# Patient Record
Sex: FEMALE | Race: AMERICAN INDIAN OR ALASKA NATIVE
[De-identification: names, ages, dates, MRNs, and addresses within clinical notes are randomized per-mention and may not be internally consistent; named-entity substitution may affect disease eponyms.]

---

## 2021-12-20 ENCOUNTER — HOSPITAL ENCOUNTER (OUTPATIENT)
Dept: HOSPITAL 5 - MAMMO | Age: 51
Discharge: HOME | End: 2021-12-20
Payer: COMMERCIAL

## 2021-12-20 DIAGNOSIS — Z12.31: Primary | ICD-10-CM

## 2021-12-20 PROCEDURE — 77067 SCR MAMMO BI INCL CAD: CPT

## 2021-12-21 NOTE — MAMMOGRAPHY REPORT
DIGITAL SCREENING MAMMOGRAM WITH CAD, 12/20/2021



CLINICAL INFORMATION / INDICATION: Routine screening mammography.



TECHNIQUE:  Digital bilateral 2D mammography was obtained in the craniocaudal and mediolateral obliqu
e projections. This examination was interpreted with the benefit of Computer-Aided Detection analysis
.



COMPARISON: None. This is the patient's first mammogram.



FINDINGS: 



Breast Density: There are scattered areas of fibroglandular density.



No dominant mass, suspicious calcifications, or architectural distortion in either breast. 



Postsurgical changes from bilateral breast reduction are noted.

 

IMPRESSION: No mammographic evidence of malignancy.



Follow up recommendation: Routine yearly



BI-RADS Category 2:  Benign.





-------------------------------------------------------------------------------------------

A "normal" or negative report should not discourage follow up or biopsy of a clinically significant f
inding.



A written summary of these findings will be mailed to the patient. The patient will be entered into a
 mammography reporting system which will generate a reminder letter for the patient's next appointmen
t at the appropriate interval.



The [employer] College [employer] recommends yearly mammograms starting at age 40 and continuing as l
mariya as a woman is in good health.  Breast MRI is recommended for women with an approximate 20-25% or 
greater lifetime risk of breast cancer, including women with a strong family history of breast or ova
crystal cancer or who have been treated for Hodgkin's disease.



Signer Name: Johnna Martinez MD 

Signed: 12/21/2021 10:27 AM

Workstation Name: [email or web address]

## 2022-03-23 ENCOUNTER — HOSPITAL ENCOUNTER (INPATIENT)
Dept: HOSPITAL 5 - ED | Age: 52
LOS: 1 days | Discharge: HOME | DRG: 291 | End: 2022-03-24
Attending: INTERNAL MEDICINE | Admitting: INTERNAL MEDICINE
Payer: SELF-PAY

## 2022-03-23 DIAGNOSIS — F32.9: ICD-10-CM

## 2022-03-23 DIAGNOSIS — Z79.01: ICD-10-CM

## 2022-03-23 DIAGNOSIS — Z82.49: ICD-10-CM

## 2022-03-23 DIAGNOSIS — I11.0: Primary | ICD-10-CM

## 2022-03-23 DIAGNOSIS — I50.43: ICD-10-CM

## 2022-03-23 LAB
ALBUMIN SERPL-MCNC: 4.4 G/DL (ref 3.9–5)
ALT SERPL-CCNC: 14 UNITS/L (ref 7–56)
BASOPHILS # (AUTO): 0 K/MM3 (ref 0–0.1)
BASOPHILS NFR BLD AUTO: 0.8 % (ref 0–1.8)
BUN SERPL-MCNC: 14 MG/DL (ref 7–17)
BUN/CREAT SERPL: 20 %
CALCIUM SERPL-MCNC: 9.5 MG/DL (ref 8.4–10.2)
EOSINOPHIL # BLD AUTO: 0.1 K/MM3 (ref 0–0.4)
EOSINOPHIL NFR BLD AUTO: 1.7 % (ref 0–4.3)
HCT VFR BLD CALC: 36.5 % (ref 30.3–42.9)
HEMOLYSIS INDEX: 5
HGB BLD-MCNC: 11.4 GM/DL (ref 10.1–14.3)
LYMPHOCYTES # BLD AUTO: 1.6 K/MM3 (ref 1.2–5.4)
LYMPHOCYTES NFR BLD AUTO: 29.9 % (ref 13.4–35)
MCHC RBC AUTO-ENTMCNC: 31 % (ref 30–34)
MCV RBC AUTO: 85 FL (ref 79–97)
MONOCYTES # (AUTO): 0.5 K/MM3 (ref 0–0.8)
MONOCYTES % (AUTO): 8.3 % (ref 0–7.3)
PLATELET # BLD: 284 K/MM3 (ref 140–440)
RBC # BLD AUTO: 4.3 M/MM3 (ref 3.65–5.03)

## 2022-03-23 PROCEDURE — 93017 CV STRESS TEST TRACING ONLY: CPT

## 2022-03-23 PROCEDURE — 80053 COMPREHEN METABOLIC PANEL: CPT

## 2022-03-23 PROCEDURE — 71275 CT ANGIOGRAPHY CHEST: CPT

## 2022-03-23 PROCEDURE — 93005 ELECTROCARDIOGRAM TRACING: CPT

## 2022-03-23 PROCEDURE — A9502 TC99M TETROFOSMIN: HCPCS

## 2022-03-23 PROCEDURE — 93306 TTE W/DOPPLER COMPLETE: CPT

## 2022-03-23 PROCEDURE — 83880 ASSAY OF NATRIURETIC PEPTIDE: CPT

## 2022-03-23 PROCEDURE — 71046 X-RAY EXAM CHEST 2 VIEWS: CPT

## 2022-03-23 PROCEDURE — 85025 COMPLETE CBC W/AUTO DIFF WBC: CPT

## 2022-03-23 PROCEDURE — C8929 TTE W OR WO FOL WCON,DOPPLER: HCPCS

## 2022-03-23 PROCEDURE — 85379 FIBRIN DEGRADATION QUANT: CPT

## 2022-03-23 PROCEDURE — 78452 HT MUSCLE IMAGE SPECT MULT: CPT

## 2022-03-23 PROCEDURE — 36415 COLL VENOUS BLD VENIPUNCTURE: CPT

## 2022-03-23 PROCEDURE — 93970 EXTREMITY STUDY: CPT

## 2022-03-23 PROCEDURE — 84484 ASSAY OF TROPONIN QUANT: CPT

## 2022-03-23 NOTE — EMERGENCY DEPARTMENT REPORT
ED Chest Pain HPI





- General


Chief Complaint: Dyspnea/Respdistress


Stated Complaint: SOB, SWOLLEN ANKLE


PUI?: No


Time Seen by Provider: 22 09:08


Source: patient


Mode of arrival: Ambulatory


Limitations: No Limitations





- History of Present Illness


Initial Comments: 


51-year-old female with a past medical history of hypertension and depression 

presents to the ER today with complaints of shortness of breath, chest pain and 

bilateral lower extremity swelling.  Patient states that 6 to 7 weeks ago she 

started noticing that when on exertion she would have substernal chest pain 

which she described as a tightness sometimes burning and severe shortness of 

breath.  She states that the symptoms did improve with rest.  She states that 

when she became short of breath and started with chest pain she would sometimes 

get dizzy and diaphoretic but she has not had any nausea or vomiting.  She 

reports that 3 to 4 weeks ago she started having swelling to her bilateral lower

extremity including ankle and feet.  She states that she has not noticed much 

improvement of the swelling with elevation of her legs.  She denies any calf 

pain or leg pain with the swelling.  She denies any known history of heart 

disease or kidney disease.  She denies any history of DVT or PE.  She denies any

risk factors for PE or DVT.  She states that her dad did suffer from heart 

disease including having an MI and CHF.  She states that she is never been 

evaluated by cardiologist in the past.


MD Complaint: chest pain, other (SOB; LE swelling)


-: Gradual, week(s) (6-7 weeks )





- Related Data


                                  Previous Rx's











 Medication  Instructions  Recorded  Last Taken  Type


 


Butalb/Acetamin/Caff -40 1 each PO Q4H PRN #12 tablet 10/19/21 Unknown Rx





[Fioricet -40]    











                                    Allergies











Allergy/AdvReac Type Severity Reaction Status Date / Time


 


No Known Allergies Allergy   Unverified 10/19/21 07:09














Heart Score





- HEART Score


History: Moderately suspicious


EKG: Non-specific


Age: 45-65


Risk factors: 1-2 risk factors (obesity, HTN, family hx (father))


Troponin: < normal limit


HEART Score: 4





- EKG Read Time


Time EKG Completed: 10:28


EKG Read Time: 10:24





- Critical Actions


Critical Actions: 4-6 pts:12-16.6% risk of adverse cardiac event. Should be 

admitted





ED Review of Systems


ROS: 


Stated complaint: SOB, SWOLLEN ANKLE


Other details as noted in HPI





Comment: All other systems reviewed and negative


Constitutional: denies: chills, fever


Eyes: denies: eye pain, eye discharge, vision change


ENT: denies: ear pain, throat pain


Respiratory: shortness of breath, wheezing.  denies: SOB with exertion, SOB at 

rest


Cardiovascular: chest pain, dyspnea on exertion, edema


Gastrointestinal: denies: abdominal pain, nausea, diarrhea


Genitourinary: denies: urgency, dysuria, discharge


Musculoskeletal: joint swelling.  denies: back pain, arthralgia


Skin: denies: rash, lesions


Neurological: denies: headache, weakness, numbness, paresthesias, confusion, 

abnormal gait, other


Psychiatric: denies: anxiety, depression, auditory hallucinations, visual 

hallucinations, homicidal thoughts, suicidal thoughts


Hematological/Lymphatic: denies: easy bleeding, easy bruising





ED Past Medical Hx





- Past Medical History


Hx Hypertension: Yes


Hx Psychiatric Treatment: Yes (depression)





- Medications


Home Medications: 


                                Home Medications











 Medication  Instructions  Recorded  Confirmed  Last Taken  Type


 


Butalb/Acetamin/Caff -40 1 each PO Q4H PRN #12 tablet 10/19/21  Unknown Rx





[Fioricet -40]     














ED Physical Exam





- General


Limitations: No Limitations


General appearance: alert, in no apparent distress, obese





- Eye


Eye exam: Present: normal appearance, PERRL, EOMI


Pupils: Present: normal accommodation





- Neck


Neck exam: Present: normal inspection, full ROM.  Absent: meningismus





- Respiratory


Respiratory exam: Present: normal lung sounds bilaterally.  Absent: respiratory 

distress, wheezes, rales, rhonchi, stridor





- Cardiovascular


Cardiovascular Exam: Present: regular rate, normal rhythm, normal heart sounds. 

Absent: systolic murmur, diastolic murmur





- GI/Abdominal


GI/Abdominal exam: Present: soft.  Absent: tenderness, guarding, rebound





- Extremities Exam


Extremities exam: Present: full ROM, pedal edema (Moderate nonpitting edema 

noted to lower legs, ankles and feet).  Absent: tenderness, calf tenderness





- Neurological Exam


Neurological exam: Present: alert, oriented X3, CN II-XII intact, normal gait





- Psychiatric


Psychiatric exam: Present: normal affect, normal mood





ED Course


                                   Vital Signs











  22





  06:09 13:15


 


Temperature 98.0 F 


 


Pulse Rate 66 64


 


Respiratory 20 16





Rate  


 


Blood Pressure 160/100 


 


Blood Pressure  146/74





[Right]  


 


O2 Sat by Pulse 98 100





Oximetry  














ED Medical Decision Making





- Lab Data


Result diagrams: 


                                 22 09:26





                                 22 09:26





- EKG Data


EKG shows normal: sinus rhythm


Rate: normal (59)


  ** No standard instances


Axis/QRS: right axis deviation





- EKG Data


Interpretation: nonspecific ST-T wave enrique





- Radiology Data


Radiology results: report reviewed


Patient: ELI FOX                                                 

               MR#:   


[id number]          


: 1970                                                                

Acct:[id number]      


 


Age/Sex: 51 / F                                                                

ADM Date: 22     


 


Loc: ED       


Attending Dr:   


 


 


Ordering Physician: BELINDA ROQUE  


Date of Service: 22  


Procedure(s): XR chest routine 2V  


Accession Number(s): [id number]  


 


cc: BELINDA ROQUE   


 


Fluoro Time In Minutes:   


 


CHEST 2 VIEWS   


 


 INDICATION:  Dyspnea.  


 


 COMPARISON:  none  


 


 FINDINGS:  


 Support devices: None.  


 


 Heart: Within normal limits.   


 Lungs/pleura: No acute air space or interstitial disease.  No pneumothorax.  


 


 Additional findings: None.  


 


 IMPRESSION:  


 No acute findings.  


 


 Signer Name: Aleks Dodson Jr, MD   


 Signed: 3/23/2022 9:51 AM  


 Workstation Name: [id number]   


 


 


Transcribed By: TTR  


Dictated By: ALEKS DODSON JR, MD  


Electronically Authenticated By: ALEKS DODSON JR, MD    


Signed Date/Time: 22                                


 


 


 


DD/DT: 22                                                            

  


TD/TT:





Patient: ELI FOX                                                 

               MR#:   


[id number]          


: 1970                                                                

Acct:[id number]      


 


Age/Sex: 51 / F                                                                

ADM Date: 22     


 


Loc: ED       


Attending Dr:   


 


 


Ordering Physician: BELINDA ROQUE  


Date of Service: 22  


Procedure(s): VL venous duplex LE BILAT  


Accession Number(s): [id number]  


 


cc: BELINDA ROQUE   


 


 


Bilateral lower extremity Doppler venous ultrasound  


 


 INDICATION: Shortness of breath  


 


 FINDINGS: Bilateral common femoral veins, superficial femoral veins and 

popliteal veins have normal


compressibility and phasic flow.  


 


 IMPRESSION:  


 No evidence for DVT.  


 


 Signer Name: Orestes Quan MD   


 Signed: 3/23/2022 10:32 AM  


 Workstation Name: [id number]   


 


 


Transcribed By: CHERELLE  


Dictated By: DENIS QUAN MD  


Electronically Authenticated By: DENIS QUAN MD    


Signed Date/Time: 22                                


 


 


 


DD/DT: 22                                                            

  


TD/TT:





Patient: ELI FOX                                                 

               MR#:   


[id number]          


: 1970                                                                

Acct:[id number]      


 


Age/Sex: 51 / F                                                                

ADM Date: 22     


 


Loc: ED       


Attending Dr:   


 


 


Ordering Physician: BELINDA ROQUE  


Date of Service: 22  


Procedure(s): CT angio chest  


Accession Number(s): [id number]  


 


cc: BELINDA ROQUE   


 


 


 


 


 CTA CHEST WITH IV CONTRAST  


 


 INDICATION:  


 SOB/elevated d-dimer. Chest pain  


 


 TECHNIQUE:  


 Axial CT images were obtained through the chest after injection of 100 mL 

Omnipaque 350 IV 


contrast. 3 plane MIP reconstructions were produced. All CT scans at this 

location are performed u


sing CT dose reduction for [facility] by means of automated exposure control.   


 


 COMPARISON:  


 None available.  


 


 FINDINGS:  


 PULMONARY ARTERIES: No pulmonary emboli.  


 AORTA AND ARTERIES: No acute abnormality.  


 MEDIASTINUM: Mild bilateral adenopathy involving both patricia..   


 LUNGS: No suspicious consolidation, nodule or mass.  No pneumothorax or pleural

 effusion.    


 


 ADDITIONAL FINDINGS: None.  


 


 UPPER ABDOMEN: No acute findings.  


 


 BONES: No significant osseous abnormality.  


 


 IMPRESSION:  


 1. No CT evidence for pulmonary embolism.   


 2. Mild bilateral hilar adenopathy.  


 


 Signer Name: Dru Katz MD   


 Signed: 3/23/2022 11:14 AM  


 Workstation Name: [id number]   


 


 


Transcribed By: BC  


Dictated By: Dru Katz MD  


Electronically Authenticated By: Dru Katz MD    


Signed Date/Time: 224                                


 


 


 


DD/DT: 22                                                            

  


TD/TT:





- Medical Decision Making


Work up reviewed --chest x-ray shows no acute abnormalities.  Troponin x2 

normal.  CBC and CMP unremarkable.  BNP is normal.  EKG shows no nonspecific T 

wave changes but STEMI or significant dysrhythmias.  Her venous Dopplers were 

negative for DVT.  CTA chest was normal without evidence of PE.  Case was disc

ussed with Dr. CALLUM Suarez.  Given patient history, and her risk factors, heart 

score 4 concerns for unstable angina patient will be admitted to the hospital 

for further cardiac work-up.


Dr. CALLUM Suarez discussed case with Dr. Iniguez, hospitalist for admission.


Patient currently sitting comfortably on the recliner.  She is not currently in 

any acute pain or respiratory distress.  Repeat vital signs are stable.  

Discussed all results with patient.  Discussed the concerns and reason for 

admission with patient.  Patient expressed understanding agree with plan.  

Patient was stable at time of discharge per


Critical care attestation.: 


If time is entered above; I have spent that time in minutes in the direct care 

of this critically ill patient, excluding procedure time.








ED Disposition


Clinical Impression: 


 Unstable angina, Dyspnea, Peripheral edema





Disposition:  ADMITTED AS INPATIENT


Is pt being admited?: Yes


Condition: Stable


Referrals: 


PRIMARY CARE,MD [Primary Care Provider] - 3-5 Days

## 2022-03-23 NOTE — HISTORY AND PHYSICAL REPORT
History of Present Illness


Date of examination: 03/23/22


Date of admission: 


March 23rd 2022


Chief complaint: 


Shortness of breath on minimal exertion for 1 week





History of present illness: 


51-year-old female with past medical history of hypertension and depression com

es in for increasing shortness of breath, chest pain and bilateral lower 

extremity swelling for 1 week.  Patient noticed exertion on dyspnea about 6 

weeks ago.  Patient stated she is compliant with her blood pressure medications.

 Did not seek any medical attention because of her problems with insurance.  

Patient is orthopneic.  No paroxysmal nocturnal dyspnea.  Chest pain is 

intermittent.  About 5 on a scale of 1-10.  Precipitated by exertion.  No 

diaphoresis.  No radiation of the chest pain.  No nausea or vomiting.  Patient 

also noticed bilateral lower extremity swelling including ankle and feet.








Heart Score





- HEART Score


History: Moderately suspicious


EKG: Non-specific


Age: 45-65


Risk factors: 1-2 risk factors (obesity, HTN, family hx (father))


Troponin: < normal limit


HEART Score: 4





- EKG Read Time


Time EKG Completed: 10:28


EKG Read Time: 10:24





- Critical Actions


Critical Actions: 4-6 pts:12-16.6% risk of adverse cardiac event. Should be 

admitted








- Past Medical History 


--Hypertension: Yes


--Psychiatric Treatment: Yes (depression)





-Past surgical history 


none





-Family history 


--CHF





-Social history


-- no smoking or alcohol








- Medications


Home Medications: 


                                Home Medications











 Medication  Instructions  Recorded  Confirmed  Last Taken  Type


 


Butalb/Acetamin/Caff -40 1 each PO Q4H PRN #12 tablet 10/19/21  Unknown Rx





[Fioricet -40]     

















Review of Systems


ROS: 


Stated complaint: SOB, SWOLLEN ANKLE


Other details as noted in HPI





Comment: All other systems reviewed and negative


Constitutional: denies: chills, fever


Eyes: denies: eye pain, eye discharge, vision change


ENT: denies: ear pain, throat pain


Respiratory: shortness of breath, wheezing.  denies: SOB with exertion, SOB at 

rest


Cardiovascular: chest pain, dyspnea on exertion, edema


Gastrointestinal: denies: abdominal pain, nausea, diarrhea


Genitourinary: denies: urgency, dysuria, discharge


Musculoskeletal: joint swelling.  denies: back pain, arthralgia


Skin: denies: rash, lesions


Neurological: denies: headache, weakness, numbness, paresthesias, confusion, 

abnormal gait, other


Psychiatric: denies: anxiety, depression, auditory hallucinations, visual 

hallucinations, homicidal thoughts, suicidal thoughts


Hematological/Lymphatic: denies: easy bleeding, easy bruising























Medications and Allergies


                                    Allergies











Allergy/AdvReac Type Severity Reaction Status Date / Time


 


No Known Allergies Allergy   Verified 03/23/22 17:53











                                Home Medications











 Medication  Instructions  Recorded  Confirmed  Last Taken  Type


 


Butalb/Acetamin/Caff -40 1 each PO Q4H PRN #12 tablet 10/19/21  Unknown Rx





[Fioricet -40]     














Exam





- Constitutional


Vitals: 


                                        











Temp Pulse Resp BP Pulse Ox


 


 98.0 F   64   16   146/74   100 


 


 03/23/22 06:09  03/23/22 13:15  03/23/22 13:15  03/23/22 13:15  03/23/22 13:15











General appearance: Present: mild distress, well-nourished





- EENT


Eyes: Present: PERRL


ENT: hearing intact, clear oral mucosa





- Neck


Neck: Present: supple, normal ROM





- Respiratory


Respiratory effort: normal


Respiratory: bilateral: CTA





- Cardiovascular


Heart rate: 106


Rhythm: regular


Heart Sounds: Present: S1 & S2.  Absent: rub, click





- Extremities


Extremities: pulses symmetrical, No edema (3+ pedal edema)


Peripheral Pulses: within normal limits





- Abdominal


General gastrointestinal: Present: soft, non-tender, non-distended, normal bowel

sounds


Female genitourinary: Present: normal





- Integumentary


Integumentary: Present: clear, warm, dry





- Musculoskeletal


Musculoskeletal: gait normal, strength equal bilaterally





- Psychiatric


Psychiatric: appropriate mood/affect, intact judgment & insight





- Neurologic


Neurologic: CNII-XII intact, moves all extremities





- Allied Health


Allied health notes reviewed: nursing, case management





HEART Score





- HEART Score


EKG: Non-specific


Age: 45-65


Risk factors: 1-2 risk factors (obesity, HTN, family hx (father))


Troponin: 


                                        











Troponin T  < 0.010 ng/mL (0.00-0.029)   03/23/22  12:38    











Troponin: < normal limit





- Critical Actions


Critical Actions: 4-6 pts:12-16.6% risk of adverse cardiac event. Should be 

admitted





Results





- Labs


CBC & Chem 7: 


                                 03/23/22 09:26





                                 03/24/22 05:48


Labs: 


                             Laboratory Last Values











WBC  5.4 K/mm3 (4.5-11.0)   03/23/22  09:26    


 


RBC  4.30 M/mm3 (3.65-5.03)   03/23/22  09:26    


 


Hgb  11.4 gm/dl (10.1-14.3)   03/23/22  09:26    


 


Hct  36.5 % (30.3-42.9)   03/23/22  09:26    


 


MCV  85 fl (79-97)   03/23/22  09:26    


 


MCH  27 pg (28-32)  L  03/23/22  09:26    


 


MCHC  31 % (30-34)   03/23/22  09:26    


 


RDW  17.2 % (13.2-15.2)  H  03/23/22  09:26    


 


Plt Count  284 K/mm3 (140-440)   03/23/22  09:26    


 


Lymph % (Auto)  29.9 % (13.4-35.0)   03/23/22  09:26    


 


Mono % (Auto)  8.3 % (0.0-7.3)  H  03/23/22  09:26    


 


Eos % (Auto)  1.7 % (0.0-4.3)   03/23/22  09:26    


 


Baso % (Auto)  0.8 % (0.0-1.8)   03/23/22  09:26    


 


Lymph # (Auto)  1.6 K/mm3 (1.2-5.4)   03/23/22  09:26    


 


Mono # (Auto)  0.5 K/mm3 (0.0-0.8)   03/23/22  09:26    


 


Eos # (Auto)  0.1 K/mm3 (0.0-0.4)   03/23/22  09:26    


 


Baso # (Auto)  0.0 K/mm3 (0.0-0.1)   03/23/22  09:26    


 


Seg Neutrophils %  59.3 % (40.0-70.0)   03/23/22  09:26    


 


Seg Neutrophils #  3.2 K/mm3 (1.8-7.7)   03/23/22  09:26    


 


D-Dimer  241.42 ng/mlDDU (0-234)  H  03/23/22  09:26    


 


Sodium  142 mmol/L (137-145)   03/23/22  09:26    


 


Potassium  3.9 mmol/L (3.6-5.0)   03/23/22  09:26    


 


Chloride  107.7 mmol/L ()  H  03/23/22  09:26    


 


Carbon Dioxide  23 mmol/L (22-30)   03/23/22  09:26    


 


Anion Gap  15 mmol/L  03/23/22  09:26    


 


BUN  14 mg/dL (7-17)   03/23/22  09:26    


 


Creatinine  0.7 mg/dL (0.6-1.2)   03/23/22  09:26    


 


Estimated GFR  > 60 ml/min  03/23/22  09:26    


 


BUN/Creatinine Ratio  20 %  03/23/22  09:26    


 


Glucose  98 mg/dL ()   03/23/22  09:26    


 


Calcium  9.5 mg/dL (8.4-10.2)   03/23/22  09:26    


 


Total Bilirubin  0.20 mg/dL (0.1-1.2)   03/23/22  09:26    


 


AST  17 units/L (5-40)   03/23/22  09:26    


 


ALT  14 units/L (7-56)   03/23/22  09:26    


 


Alkaline Phosphatase  67 units/L ()   03/23/22  09:26    


 


Troponin T  < 0.010 ng/mL (0.00-0.029)   03/23/22  12:38    


 


NT-Pro-B Natriuret Pep  87.09 pg/mL (0-900)   03/23/22  09:26    


 


Total Protein  7.3 g/dL (6.3-8.2)   03/23/22  09:26    


 


Albumin  4.4 g/dL (3.9-5)   03/23/22  09:26    


 


Albumin/Globulin Ratio  1.5 %  03/23/22  09:26    














- Imaging and Cardiology


EKG: report reviewed (Sinus rhythm no acute ST-T wave changes)





Assessment and Plan


Advance Directives: Yes (Full code)


VTE prophylaxis?: Chemical


Plan of care discussed with patient/family: Yes





- Patient Problems


(1) Acute exacerbation of CHF (congestive heart failure)


Current Visit: Yes   Status: Acute   


Qualifiers: 


   Heart failure type: combined systolic and diastolic   Qualified Code(s): 

I50.43 - Acute on chronic combined systolic (congestive) and diastolic 

(congestive) heart failure   


Plan to address problem: 


Patient initiated on IV Lasix and potassium


Echocardiogram for ejection fraction


Strict I and O's


Daily weights


Control blood pressure








(2) Acute coronary syndrome


Current Visit: Yes   Status: Acute   


Plan to address problem: 


Serial troponins and Lexiscan in the morning








(3) Hypertension


Current Visit: Yes   Status: Chronic   


Qualifiers: 


   Hypertension type: primary hypertension   Qualified Code(s): I10 - Essential 

(primary) hypertension   


Plan to address problem: 


Continue antihypertensives and adjust medications








(4) Peripheral edema


Current Visit: Yes   Status: Acute   


Plan to address problem: 


IV Lasix for now








(5) DVT prophylaxis


Current Visit: Yes   Status: Acute   


Plan to address problem: 


On heparin and GI prophylaxis








(6) Advance care planning


Current Visit: Yes   Status: Acute   


Plan to address problem: 


Disease education conducted, care plan discussed, diagnosis discussed, prognosis

discussed.  Patient is full code.  Patient acknowledges understanding and 

agreement with care plan.  +30 minutes.

## 2022-03-23 NOTE — XRAY REPORT
CHEST 2 VIEWS 



INDICATION:  Dyspnea.



COMPARISON:  none



FINDINGS:

Support devices: None.



Heart: Within normal limits. 

Lungs/pleura: No acute air space or interstitial disease.  No pneumothorax.



Additional findings: None.



IMPRESSION:

No acute findings.



Signer Name: Aleks Dodson Jr, MD 

Signed: 3/23/2022 9:51 AM

Workstation Name: [id number]

## 2022-03-23 NOTE — CAT SCAN REPORT
CTA CHEST WITH IV CONTRAST



INDICATION:

SOB/elevated d-dimer. Chest pain



TECHNIQUE:

Axial CT images were obtained through the chest after injection of 100 mL Omnipaque 350 IV contrast. 
3 plane MIP reconstructions were produced. All CT scans at this location are performed using CT dose 
reduction for [facility] by means of automated exposure control. 



COMPARISON:

None available.



FINDINGS:

PULMONARY ARTERIES: No pulmonary emboli.

AORTA AND ARTERIES: No acute abnormality.

MEDIASTINUM: Mild bilateral adenopathy involving both patricia.. 

LUNGS: No suspicious consolidation, nodule or mass.  No pneumothorax or pleural effusion.  



ADDITIONAL FINDINGS: None.



UPPER ABDOMEN: No acute findings.



BONES: No significant osseous abnormality.



IMPRESSION:

1. No CT evidence for pulmonary embolism. 

2. Mild bilateral hilar adenopathy.



Signer Name: Dru Katz MD 

Signed: 3/23/2022 11:14 AM

Workstation Name: [id number]

## 2022-03-24 VITALS — DIASTOLIC BLOOD PRESSURE: 63 MMHG | SYSTOLIC BLOOD PRESSURE: 135 MMHG

## 2022-03-24 LAB
ALBUMIN SERPL-MCNC: 3.8 G/DL (ref 3.9–5)
ALT SERPL-CCNC: 11 UNITS/L (ref 7–56)
BUN SERPL-MCNC: 10 MG/DL (ref 7–17)
BUN/CREAT SERPL: 14 %
CALCIUM SERPL-MCNC: 8.7 MG/DL (ref 8.4–10.2)
HEMOLYSIS INDEX: 2

## 2022-03-24 RX ADMIN — POTASSIUM CHLORIDE SCH: 1500 TABLET, EXTENDED RELEASE ORAL at 17:29

## 2022-03-24 RX ADMIN — FUROSEMIDE SCH MG: 10 INJECTION, SOLUTION INTRAVENOUS at 12:32

## 2022-03-24 RX ADMIN — FUROSEMIDE SCH MG: 10 INJECTION, SOLUTION INTRAVENOUS at 17:32

## 2022-03-24 RX ADMIN — POTASSIUM CHLORIDE SCH MEQ: 1500 TABLET, EXTENDED RELEASE ORAL at 12:31

## 2022-03-24 RX ADMIN — FUROSEMIDE SCH MG: 10 INJECTION, SOLUTION INTRAVENOUS at 12:28

## 2022-03-24 NOTE — ELECTROCARDIOGRAPH REPORT
[facility]

                                       

Test Date:    2022               Test Time:    10:28:08

Pat Name:     ELI FOX            Department:   

Patient ID:   [id number]          Room:         A465

Gender:       F                        Technician:   LUCERO

:          1970               Requested By: BELINDA ROQUE

Order Number: [id number]              Reading MD:   Riley Leon

                                 Measurements

Intervals                              Axis          

Rate:         59                       P:            121

WY:           139                      QRS:          163

QRSD:         81                       T:            119

QT:           434                                    

QTc:          431                                    

                           Interpretive Statements

Right and left arm electrode reversal, interpretation assumes no reversal

Sinus rhythm

Right axis deviation

Nonspecific T abnrm, anterolateral leads

No previous ECG available for comparison

Electronically Signed On 3- 11:24:25 EDT by Riley Leon

## 2022-03-24 NOTE — CONSULTATION
History of Present Illness


Consult date: 03/24/22


Requesting physician: LARRY ODELL


Consult reason: congestive heart failure


History of present illness: 





Is a 51-year-old female with a past medical history of hypertension and 

depression who presented to the ED for complaint of shortness of breath, chest 

pain and bilateral lower extremity edema for 3 to 4 days.  Patient reports the 

symptoms have been going on for several weeks but however she feels they have 

worsened over the last several days.  She states her shortness of breath is 

worsened with exertion.  She states she has had some burning chest pain at 

times.  Patient denies any nausea, vomiting, diaphoresis, orthopnea, PND.  

Patient is previously unknown to our practice.  Cardiology is consulted for CHF





Past History


Past Medical History: hypertension, other (Depression)


Past Surgical History: No surgical history


Social history: no significant social history


Family history: hypertension, other (Heart failure)





Medications and Allergies


                                    Allergies











Allergy/AdvReac Type Severity Reaction Status Date / Time


 


No Known Allergies Allergy   Verified 03/23/22 17:53











                                Home Medications











 Medication  Instructions  Recorded  Confirmed  Last Taken  Type


 


Butalb/Acetamin/Caff -40 1 each PO Q4H PRN #12 tablet 10/19/21  Unknown Rx





[Fioricet -40]     











Active Meds: 


Active Medications





Acetaminophen (Acetaminophen 325 Mg Tab)  650 mg PO Q4H PRN


   PRN Reason: Pain MILD(1-3)/Fever >100.5/HA


Carvedilol (Carvedilol 3.125 Mg Tab)  3.125 mg PO BID [facility]


   Last Admin: 03/24/22 12:30 Dose:  3.125 mg


   


Enoxaparin Sodium (Enoxaparin 40 Mg/0.4 Ml Inj)  40 mg SUB-Q QDAY [facility]


Furosemide (Furosemide 40 Mg/4 Ml Inj)  40 mg IV 0600,1800 [facility]


   Last Admin: 03/24/22 12:32 Dose:  40 mg


   


Hydromorphone HCl (Hydromorphone 1 Mg/1 Ml Inj)  0.5 mg IV Q3H PRN


   PRN Reason: Pain , Severe (7-10)


Losartan Potassium (Losartan 25 Mg Tab)  25 mg PO QDAY [facility]


   Last Admin: 03/24/22 12:32 Dose:  25 mg


   


Ondansetron HCl (Ondansetron 4 Mg/2 Ml Inj)  4 mg IV Q8H PRN


   PRN Reason: Nausea And Vomiting


Oxycodone/Acetaminophen (Oxycodone /Acetaminophen 5-325mg Tab)  1 tab PO Q6H PRN


   PRN Reason: Pain, Moderate (4-6)


Potassium Chloride (Potassium Chloride Er 20 Meq Tab)  20 meq PO Q12H ARTHUR


   Last Admin: 03/24/22 12:31 Dose:  20 meq


   


Sodium Chloride (Sodium Chloride 0.9% 10 Ml Flush Syringe)  10 ml IV BID ARTHUR


Sodium Chloride (Sodium Chloride 0.9% 10 Ml Flush Syringe)  10 ml IV PRN PRN


   PRN Reason: LINE FLUSH


Sodium Chloride (Sodium Chloride 0.9% 10 Ml Flush Syringe)  10 ml IV BID ARTHUR


Sodium Chloride (Sodium Chloride 0.9% 10 Ml Flush Syringe)  10 ml IV PRN PRN


   PRN Reason: LINE FLUSH











Review of Systems


Constitutional: no weight loss, no weight gain, no fever, no chills


Cardiovascular: chest pain, shortness of breath, no orthopnea, no palpitations, 

no syncope


Respiratory: shortness of breath, dyspnea on exertion


Gastrointestinal: no abdominal pain, no nausea, no vomiting


Musculoskeletal: no shooting arm pain, no arm numbness/tingling, no low back 

pain


Integumentary: no rash, no pruritis, no redness


Neurological: no head injury, no transient paralysis


Psychiatric: no anxiety, no memory loss


Endocrine: no cold intolerance, no heat intolerance


Hematologic/Lymphatic: no easy bruising, no easy bleeding





Physical Examination


                                   Vital Signs











Temp Pulse Resp BP Pulse Ox


 


 98.0 F   66   20   160/100   98 


 


 03/23/22 06:09  03/23/22 06:09  03/23/22 06:09  03/23/22 06:09  03/23/22 06:09











General appearance: no acute distress


HEENT: Positive: PERRL


Neck: Positive: trachea midline


Cardiac: Positive: Reg Rate and Rhythm


Lungs: Positive: Normal Breath Sounds


Neuro: Positive: Grossly Intact


Abdomen: Positive: Soft, Active Bowel Sounds


Skin: Negative: Rash, Suspicious Lesions, Ulceration


Extremities: Present: upper extr. pulses.  Absent: edema





Results





                                 03/23/22 09:26





                                 03/24/22 05:48


                                 Cardiac Enzymes











  03/24/22 Range/Units





  05:48 


 


AST  15  (5-40)  units/L








                          Comprehensive Metabolic Panel











  03/24/22 Range/Units





  05:48 


 


Sodium  140  (137-145)  mmol/L


 


Potassium  3.7  (3.6-5.0)  mmol/L


 


Chloride  105.9  ()  mmol/L


 


Carbon Dioxide  24  (22-30)  mmol/L


 


BUN  10  (7-17)  mg/dL


 


Creatinine  0.7  (0.6-1.2)  mg/dL


 


Glucose  94  ()  mg/dL


 


Calcium  8.7  (8.4-10.2)  mg/dL


 


AST  15  (5-40)  units/L


 


ALT  11  (7-56)  units/L


 


Alkaline Phosphatase  60  ()  units/L


 


Total Protein  6.4  (6.3-8.2)  g/dL


 


Albumin  3.8 L  (3.9-5)  g/dL














- Imaging and Cardiology


Echo: report reviewed





EKG interpretations





- Telemetry


EKG Rhythm: Sinus Rhythm





- EKG


Sinus rhythms and dysrhythmias: sinus rhythm


Repolarization changes or abnormalities: nonspecific abnormality, ST segment, 

and/or T wave





Assessment and Plan





Is a 51-year-old female with a past medical history of hypertension and 

depression who presented to the ED for complaint of shortness of breath, chest 

pain and bilateral lower extremity edema for 3 to 4 days





Atypical chest pain


Hypertension


Shortness of breath


History of depression





Lexiscan MPI stress test 3/24/2022-stress test is negative for signs of ischemia


Echo 3/24/2022-EF 55 to 60%.  Right ventricular systolic function is normal.  

Doppler flow pattern suggests impaired LV relaxation left and right atrium are 

normal in size.  No pericardial effusion





Plan:


EKG has right and left arm electrode reversal.  Interpretation assumes no 

reversal.  EKG shows sinus rhythm 59 nonspecific T abnormalities.  Right axis 

deviation.  No acute ischemic changes.  Troponins negative x4.  Patient 

currently chest pain-free.  AMI ruled out


BNP is negative, CXR, and CT chest shows no acute findings.  Furthermore patient

appears euvolemic on exam.  Patient is not clinically in heart failure


Patient had normal echo and normal stress test.  Results noted above


Cardiac status stable for discharge





Patient should follow-up with their primary care provider 1 to 2 weeks after 

discharge


Patient seen in conjunction with Dr. Leon who agrees with this plan of care








- Patient Problems


(1) Depression


Current Visit: Yes   Status: Acute   





(2) Atypical chest pain


Current Visit: Yes   Status: Acute   





(3) Hypertension


Current Visit: Yes   Status: Chronic   


Qualifiers: 


   Hypertension type: primary hypertension   Qualified Code(s): I10 - Essential 

(primary) hypertension

## 2022-03-24 NOTE — TREADMILL REPORT
DATE OF SERVICE: 03/24/2022



NUCLEAR STRESS TEST



REFERRING PHYSICIAN:  Hospitalist.



PROTOCOL:  The patient was assessed in postoperative state, given 10 mCi of 

technetium at rest.  The patient had rest imaging.  The patient underwent 

Lexiscan stress test per standard protocol.  At peak stress, the patient given 

26 mCi technetium shortly after stress imaging.  Raw imaging revealed mild GI 

artifact, no significant motion artifact.  SPECT imaging examined carefully in 

the horizontal long axis, vertical long axis, short axis views.  There was no 

significant fixed or reversible perfusion defect suggestive of prior infarction 

or active ischemia.  Gated wall motion reveals normal systolic thickening, 

calculated ejection fraction 64%.



CONCLUSIONS:

1.  Normal myocardial perfusion scan without evidence of active ischemia or 

prior infarction.

2.  Normal left ventricular systolic performance without evidence of transient 

ischemic dilatation or stress-induced segmental wall motion abnormalities.







DD: 03/24/2022 11:45 AM

DT: 03/24/2022 10:26 PM

TID: [id number] RECEIPT: [id number]

SUSU/JANINA